# Patient Record
Sex: MALE | Race: OTHER | URBAN - METROPOLITAN AREA
[De-identification: names, ages, dates, MRNs, and addresses within clinical notes are randomized per-mention and may not be internally consistent; named-entity substitution may affect disease eponyms.]

---

## 2017-08-15 ENCOUNTER — HOSPITAL ENCOUNTER (OUTPATIENT)
Dept: WOUND CARE | Age: 42
Discharge: HOME | End: 2017-08-15
Attending: SURGERY | Admitting: SURGERY

## 2017-08-15 VITALS
DIASTOLIC BLOOD PRESSURE: 75 MMHG | TEMPERATURE: 98.1 F | WEIGHT: 155 LBS | SYSTOLIC BLOOD PRESSURE: 125 MMHG | HEART RATE: 65 BPM | BODY MASS INDEX: 24.28 KG/M2

## 2017-08-15 PROCEDURE — 99202 OFFICE O/P NEW SF 15 MIN: CPT | Performed by: SURGERY

## 2022-01-27 ENCOUNTER — HOSPITAL ENCOUNTER (OUTPATIENT)
Age: 47
Discharge: HOME | End: 2022-01-27
Payer: COMMERCIAL

## 2022-01-27 ENCOUNTER — HOSPITAL ENCOUNTER (OUTPATIENT)
Dept: GENERAL RADIOLOGY | Age: 47
Discharge: HOME | End: 2022-01-27
Payer: COMMERCIAL

## 2022-01-27 DIAGNOSIS — M25.531 RIGHT WRIST PAIN: ICD-10-CM

## 2022-01-27 PROCEDURE — 73110 X-RAY EXAM OF WRIST: CPT

## 2024-05-09 ENCOUNTER — OFFICE VISIT (OUTPATIENT)
Dept: INFECTIOUS DISEASES | Age: 49
End: 2024-05-09

## 2024-05-09 VITALS
BODY MASS INDEX: 27.75 KG/M2 | TEMPERATURE: 98.1 F | WEIGHT: 176.8 LBS | SYSTOLIC BLOOD PRESSURE: 125 MMHG | DIASTOLIC BLOOD PRESSURE: 78 MMHG | OXYGEN SATURATION: 95 % | HEART RATE: 54 BPM | HEIGHT: 67 IN

## 2024-05-09 DIAGNOSIS — Z72.52 HIGH RISK HOMOSEXUAL BEHAVIOR: ICD-10-CM

## 2024-05-09 DIAGNOSIS — A51.0 GENITAL CHANCRE: ICD-10-CM

## 2024-05-09 DIAGNOSIS — A53.9 SYPHILIS: Primary | ICD-10-CM

## 2024-05-09 DIAGNOSIS — Z11.3 SCREEN FOR SEXUALLY TRANSMITTED DISEASES: ICD-10-CM

## 2024-05-09 DIAGNOSIS — A51.0 PRIMARY SYPHILIS: ICD-10-CM

## 2024-05-09 DIAGNOSIS — Z70.8 SEXUALLY TRANSMITTED DISEASE COUNSELING: ICD-10-CM

## 2024-05-09 RX ORDER — DOXYCYCLINE HYCLATE 100 MG/1
100 CAPSULE ORAL 2 TIMES DAILY
COMMUNITY

## 2024-05-09 RX ORDER — EMTRICITABINE AND TENOFOVIR DISOPROXIL FUMARATE 200; 300 MG/1; MG/1
1 TABLET, FILM COATED ORAL DAILY
COMMUNITY

## 2024-05-09 RX ORDER — ACETAMINOPHEN 160 MG
1 TABLET,DISINTEGRATING ORAL DAILY
COMMUNITY

## 2024-05-09 RX ORDER — PENICILLIN G BENZATHINE 2400000 [IU]/4ML
2.4 INJECTION, SUSPENSION INTRAMUSCULAR ONCE
Qty: 4 ML | Refills: 0 | Status: CANCELLED | OUTPATIENT
Start: 2024-05-09 | End: 2024-05-09

## 2024-05-09 RX ORDER — ROSUVASTATIN CALCIUM 40 MG/1
40 TABLET, COATED ORAL EVERY EVENING
COMMUNITY

## 2024-05-09 ASSESSMENT — ENCOUNTER SYMPTOMS
SORE THROAT: 0
RHINORRHEA: 0
EYE DISCHARGE: 0
SHORTNESS OF BREATH: 0
BACK PAIN: 0
ABDOMINAL PAIN: 0
SINUS PRESSURE: 0
CONSTIPATION: 0
NAUSEA: 0
WHEEZING: 0
EYE REDNESS: 0
COUGH: 0
DIARRHEA: 0
SINUS PAIN: 0

## 2024-05-09 NOTE — PROGRESS NOTES
available to me    The patient had an RPR titer of 1:8 on 3/5/2024 and repeat RPR titer was 1:1 on 4/6/24    RECOMMENDATIONS:      Diagnostic Workup:    Please obtain baseline HIV testing results from the PCPs office  If the patient had a urine, oral and rectal gonorrhea and chlamydia screen is done, please obtain the records of the test results.  If they have not been done, the patient is an MSM and has multiple partners and should be tested for gonorrhea and chlamydia from these 3 sites  Recommend repeat RPR titer to be done at 6 months and 12 months interval following the primary syphilis treatment, counting from today.  Seroreversion over time or fourfold decline and then on treponemal titers is considered an adequate response to syphilis treatment  Continue to follow fever curve at home.    Antimicrobials:    Unfortunately, the patient has not received a full dose of Bicillin LA for primary syphilis.  He has only been taking doxycycline 100 mg once daily instead of twice daily, hence none of these regimens have given him full treatment yet.  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  Since we have the Bicillin LA available in our office, I discussed with the patient and he is agreeable to receive the Bicillin LA shot here.  We will give him intramuscular Bicillin LA 2,400,000 units, which would be a full treatment for primary syphilis  High risk sexual behavior noted.  Extensive counseling done  If the patient has persistent signs or symptoms, documented fourfold increase in nontreponemal titers for more than 2 weeks after initial decline or less than fourfold decrease in the nontypeable titer after 12 months of treatment, that would be considered a treatment failure  Discussed the importance of not taking any multivitamin or over-the-counter herbal medicines for 2 hours before or after Truvada, which the patient is taking for PrEP.  Discussed the importance of medication urines for Truvada.